# Patient Record
Sex: FEMALE | Race: BLACK OR AFRICAN AMERICAN | NOT HISPANIC OR LATINO | Employment: UNEMPLOYED | URBAN - METROPOLITAN AREA
[De-identification: names, ages, dates, MRNs, and addresses within clinical notes are randomized per-mention and may not be internally consistent; named-entity substitution may affect disease eponyms.]

---

## 2017-06-12 ENCOUNTER — HOSPITAL ENCOUNTER (EMERGENCY)
Facility: OTHER | Age: 47
Discharge: HOME | End: 2017-06-12
Attending: EMERGENCY MEDICINE
Payer: COMMERCIAL

## 2017-06-12 VITALS
WEIGHT: 174 LBS | TEMPERATURE: 98 F | OXYGEN SATURATION: 98 % | BODY MASS INDEX: 27.31 KG/M2 | SYSTOLIC BLOOD PRESSURE: 128 MMHG | DIASTOLIC BLOOD PRESSURE: 82 MMHG | HEART RATE: 85 BPM | HEIGHT: 67 IN | RESPIRATION RATE: 14 BRPM

## 2017-06-12 DIAGNOSIS — S29.019A STRAIN OF THORACIC SPINE, INITIAL ENCOUNTER: Primary | ICD-10-CM

## 2017-06-12 DIAGNOSIS — M54.9 BACK PAIN: ICD-10-CM

## 2017-06-12 LAB
B-HCG UR QL: NEGATIVE
CTP QC/QA: YES

## 2017-06-12 PROCEDURE — 63600175 PHARM REV CODE 636 W HCPCS: Performed by: EMERGENCY MEDICINE

## 2017-06-12 PROCEDURE — 96372 THER/PROPH/DIAG INJ SC/IM: CPT

## 2017-06-12 PROCEDURE — 99284 EMERGENCY DEPT VISIT MOD MDM: CPT | Mod: 25

## 2017-06-12 PROCEDURE — 81025 URINE PREGNANCY TEST: CPT | Performed by: EMERGENCY MEDICINE

## 2017-06-12 RX ORDER — ORPHENADRINE CITRATE 100 MG/1
100 TABLET, EXTENDED RELEASE ORAL DAILY PRN
Qty: 5 TABLET | Refills: 0 | Status: SHIPPED | OUTPATIENT
Start: 2017-06-12 | End: 2017-06-17

## 2017-06-12 RX ORDER — IBUPROFEN 800 MG/1
800 TABLET ORAL EVERY 6 HOURS PRN
Qty: 12 TABLET | Refills: 0 | Status: SHIPPED | OUTPATIENT
Start: 2017-06-12

## 2017-06-12 RX ORDER — KETOROLAC TROMETHAMINE 30 MG/ML
15 INJECTION, SOLUTION INTRAMUSCULAR; INTRAVENOUS
Status: COMPLETED | OUTPATIENT
Start: 2017-06-12 | End: 2017-06-12

## 2017-06-12 RX ORDER — OXYCODONE AND ACETAMINOPHEN 5; 325 MG/1; MG/1
1 TABLET ORAL EVERY 6 HOURS PRN
Qty: 5 TABLET | Refills: 0 | Status: SHIPPED | OUTPATIENT
Start: 2017-06-12

## 2017-06-12 RX ADMIN — KETOROLAC TROMETHAMINE 15 MG: 30 INJECTION, SOLUTION INTRAMUSCULAR at 08:06

## 2017-06-12 NOTE — ED PROVIDER NOTES
Encounter Date: 6/12/2017    SCRIBE #1 NOTE: I, Trudy Lynn, am scribing for, and in the presence of,  Dr. Ramirez. I have scribed the entire note.       History     Chief Complaint   Patient presents with    Back Pain     Patient c/o intermittent right mid back pain for several days.  Patient denies trauma, denies radiation of pain, denies urinary issues.       Review of patient's allergies indicates:   Allergen Reactions    Kiwi (actinidia chinensis)      Time seen by provider: 8:12 AM    This is a 46 y.o. female who presents with complaint of intermittent right sided mid back pain that began several days ago. She reports pain is worse with movement and deep inspiration. She denies urinary or bowel incontinence. She denies recent injury or trauma. She reports history of similar pain intermittently over the last several years. LMP May 25th. She admits to smoking marijuana. She reports quitting tobacco 10 years ago. She denies EtOH or other drug use.      The history is provided by the patient.     History reviewed. No pertinent past medical history.  History reviewed. No pertinent surgical history.  History reviewed. No pertinent family history.  Social History   Substance Use Topics    Smoking status: Former Smoker     Types: Cigarettes    Smokeless tobacco: Not on file    Alcohol use Yes      Comment: ocassion     Review of Systems   Constitutional: Negative for chills and fever.   HENT: Negative for congestion and sore throat.    Eyes: Negative for photophobia and redness.   Respiratory: Negative for cough and shortness of breath.    Cardiovascular: Negative for chest pain.   Gastrointestinal: Negative for abdominal pain, nausea and vomiting.   Genitourinary: Negative for difficulty urinating, dysuria and urgency.   Musculoskeletal: Positive for back pain (mid R). Negative for neck pain.   Skin: Negative for rash.   Neurological: Negative for weakness, light-headedness and headaches.    Psychiatric/Behavioral: Negative for confusion.       Physical Exam     Initial Vitals [06/12/17 0737]   BP Pulse Resp Temp SpO2   128/82 85 14 97.8 °F (36.6 °C) 98 %     Physical Exam    Nursing note and vitals reviewed.  Constitutional: She appears well-developed and well-nourished. She is not diaphoretic. No distress.   HENT:   Head: Normocephalic and atraumatic.   Oropharynx is clear and intact.  Moist mucous membranes.   Eyes: EOM are normal. Pupils are equal, round, and reactive to light. Right eye exhibits no discharge. Left eye exhibits no discharge.   Conjunctiva are pink, clear, and intact.   Neck: Normal range of motion. Neck supple. No JVD present.   Cardiovascular: Normal rate, regular rhythm, S1 normal, S2 normal and normal heart sounds. Exam reveals no gallop and no friction rub.    No murmur heard.  No palpable cord.   Pulmonary/Chest: Breath sounds normal. No respiratory distress. She has no wheezes. She has no rhonchi. She has no rales.   Abdominal: Soft. Bowel sounds are normal. She exhibits no distension. There is no tenderness. There is no rebound and no guarding.   Musculoskeletal: Normal range of motion. She exhibits tenderness. She exhibits no edema.   Right sided paraspinal tenderness to the R of T6, T7, and T8. No lower extremity edema.   Neurological: She is alert and oriented to person, place, and time. She has normal strength. No sensory deficit.   No saddle paraesthesias. No urinary retention or bowel/bladder incontinence. Sensations intact. Strength 5/5. No midline C-T-L-spine tenderness to palpation, crepitus or step-offs.    Skin: Skin is warm and dry. No rash and no abscess noted. No erythema. No pallor.   No rashes, no lesions, or skin tenting.   Psychiatric: She has a normal mood and affect. Her behavior is normal. Judgment and thought content normal.         ED Course   Procedures  Labs Reviewed   POCT URINE PREGNANCY     Imaging Results          X-Ray Chest PA And Lateral  (Final result)  Result time 06/12/17 08:36:14    Final result by Pavan Hale MD (06/12/17 08:36:14)                 Impression:     No active cardiopulmonary disease      Electronically signed by: Dr. PAVAN HALE MD  Date:     06/12/17  Time:    08:36              Narrative:    Comparison: None    Results: 2 views.  The heart size and pulmonary vascularity are within normal limits.  No hilar or mediastinal enlargement.  Lungs are well expanded and clear.  No pleural fluid or pneumothorax.  Soft tissues and osseous structures are unremarkable.                                   X-Rays:   Independently Interpreted Readings:   Chest X-Ray: No acute findings visualized.      Medical Decision Making:   Independently Interpreted Test(s):   I have ordered and independently interpreted X-rays - see prior notes.  Clinical Tests:   Lab Tests: Ordered and Reviewed  Radiological Study: Ordered and Reviewed            Scribe Attestation:   Scribe #1: I performed the above scribed service and the documentation accurately describes the services I performed. I attest to the accuracy of the note.    Attending Attestation:           Physician Attestation for Scribe:  Physician Attestation Statement for Scribe #1: I, Dr. Ramirez, reviewed documentation, as scribed by Trudy Lynn in my presence, and it is both accurate and complete.         Attending ED Notes:   Emergent evaluation a 46-year-old female with nontraumatic right-sided back pain.  Pain is worse with movement and better with rest.  No midline C-spine, T-spine or L-spine tenderness to palpation, crepitus or step-offs.  No clinical evidence of cauda equina syndrome. Strength 5/5 throughout.  Sensation intact to light touch throughout.  Two point discrimination intact throughout.  No saddle paresthesias.  Reflexes 2+ throughout. no acute findings on chest x-ray.  The patient is extensive the counseled on her diagnosis and treatment including all diagnostic and physical exam  findings.  The patient is discharged in good condition and directed follow-up with PCP in the next 24-48 hours.           ED Course     Clinical Impression:     1. Strain of thoracic spine, initial encounter    2. Back pain              Eduardo Knight MD  06/12/17 [id number]